# Patient Record
Sex: MALE | Race: WHITE | NOT HISPANIC OR LATINO
[De-identification: names, ages, dates, MRNs, and addresses within clinical notes are randomized per-mention and may not be internally consistent; named-entity substitution may affect disease eponyms.]

---
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
## 2022-03-02 ENCOUNTER — EMERGENCY (EMERGENCY)
Facility: HOSPITAL | Age: 22
LOS: 1 days | Discharge: HOME | End: 2022-03-02
Payer: COMMERCIAL

## 2022-03-02 VITALS
HEART RATE: 89 BPM | TEMPERATURE: 98 F | OXYGEN SATURATION: 96 % | WEIGHT: 184.97 LBS | RESPIRATION RATE: 20 BRPM | SYSTOLIC BLOOD PRESSURE: 99 MMHG | DIASTOLIC BLOOD PRESSURE: 66 MMHG | HEIGHT: 78 IN

## 2022-03-02 PROCEDURE — L9991: CPT

## 2022-03-02 PROCEDURE — 99283 EMERGENCY DEPT VISIT LOW MDM: CPT

## 2022-04-26 NOTE — ED PROVIDER NOTE - CARE PLAN
Detail Level: Detailed
Plan: Patient is due for next Melanoma FBSE 08/2022
Discontinue Regimen: 5FU
Principal Discharge DX:	Shoulder pain

## 2022-11-03 NOTE — ED PROVIDER NOTE - MEDICAL DECISION MAKING DETAILS
ED Dr Chakraborty KM Pain of right side back in area of latissimus dorsi. Likely muscular strain. Will give pain meds, muscle relaxer and re-assess. Follow-up PMD